# Patient Record
Sex: MALE | Race: WHITE
[De-identification: names, ages, dates, MRNs, and addresses within clinical notes are randomized per-mention and may not be internally consistent; named-entity substitution may affect disease eponyms.]

---

## 2024-06-03 ENCOUNTER — APPOINTMENT (OUTPATIENT)
Dept: PEDIATRIC ORTHOPEDIC SURGERY | Facility: CLINIC | Age: 34
End: 2024-06-03
Payer: COMMERCIAL

## 2024-06-03 VITALS — HEIGHT: 68.9 IN | TEMPERATURE: 96.6 F | WEIGHT: 205 LBS | BODY MASS INDEX: 30.36 KG/M2

## 2024-06-03 DIAGNOSIS — M41.25 OTHER IDIOPATHIC SCOLIOSIS, THORACOLUMBAR REGION: ICD-10-CM

## 2024-06-03 PROBLEM — Z00.00 ENCOUNTER FOR PREVENTIVE HEALTH EXAMINATION: Status: ACTIVE | Noted: 2024-06-03

## 2024-06-03 PROCEDURE — 99202 OFFICE O/P NEW SF 15 MIN: CPT

## 2024-06-03 PROCEDURE — 72081 X-RAY EXAM ENTIRE SPI 1 VW: CPT

## 2024-06-03 RX ORDER — MELOXICAM 15 MG/1
15 TABLET ORAL
Qty: 30 | Refills: 1 | Status: ACTIVE | COMMUNITY
Start: 2024-06-03 | End: 1900-01-01

## 2024-06-03 NOTE — ASSESSMENT
[FreeTextEntry1] : Impression: Scoliosis.  This patient has been made aware as there is significant curvature present.  He will be treated with a course of Mobic with GI precautions along with formal physical therapy.  He has had ongoing back pain and he would like to see a scoliosis surgeon.  In this regard he has been given names in the area.

## 2024-06-03 NOTE — HISTORY OF PRESENT ILLNESS
[de-identified] : This 33-year-old pleasant gentleman is seen for evaluation of the spine.  He was diagnosed 3 years ago in [place] of having scoliosis.  He otherwise has been doing well over the long-term he has no significant complaints of back pain numbness or paresthesias.  He does have a history of gout for which she is being followed by a rheumatologist.  Past history is otherwise noncontributory